# Patient Record
Sex: MALE | Race: WHITE | HISPANIC OR LATINO | Employment: STUDENT | URBAN - METROPOLITAN AREA
[De-identification: names, ages, dates, MRNs, and addresses within clinical notes are randomized per-mention and may not be internally consistent; named-entity substitution may affect disease eponyms.]

---
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
## 2025-01-28 DIAGNOSIS — M54.32 SCIATICA OF LEFT SIDE: Primary | ICD-10-CM

## 2025-02-23 ENCOUNTER — OFFICE VISIT (OUTPATIENT)
Dept: URGENT CARE | Age: 20
End: 2025-02-23
Payer: MEDICAID

## 2025-02-23 ENCOUNTER — APPOINTMENT (OUTPATIENT)
Dept: URGENT CARE | Age: 20
End: 2025-02-23
Payer: MEDICAID

## 2025-02-23 DIAGNOSIS — B34.9 VIRAL ILLNESS: Primary | ICD-10-CM

## 2025-02-23 DIAGNOSIS — J06.9 UPPER RESPIRATORY TRACT INFECTION, UNSPECIFIED TYPE: ICD-10-CM

## 2025-02-23 PROCEDURE — 99213 OFFICE O/P EST LOW 20 MIN: CPT | Performed by: PHYSICIAN ASSISTANT

## 2025-02-23 PROCEDURE — 1036F TOBACCO NON-USER: CPT | Performed by: PHYSICIAN ASSISTANT

## 2025-02-23 NOTE — PATIENT INSTRUCTIONS
You were seen for cold like symptoms.  You most likely have a viral upper respiratory tract infection.  Antibiotics are not needed at this time.    I recommend supportive therapy with the following medications below.    URI  1.  Please take Tylenol every 6 hours as needed for fever.  Alternate with ibuprofen every 6 hours.  2.  Use Tea and honey for cough. This is known to work better than most OTC medications.  3.  You can use Mucinex to break up congestion.  If prescribed use Pseudoephedrine-bromphen for cough. -This is an antihistamine, cough suppressant, and decongestant. It can help relieve cough, runny nose, stuffy nose, sneezing, and itchy or watery eyes.  4.  Stay well-hydrated and drink lots of fluids.  5.  Follow up with your primary care physician.  6.  Return to ED if symptoms worsen or new symptoms develop.    Based on clinical exam findings and history you most likely have a upper respiratory tract infection.  You are contagious as soon as the symptoms start.  Your symptoms may persist up to 2-3 weeks.  Symptoms usually peak by days 3 or 5.  The virus can be shed by hand-to-hand contact, nose and eye contact. Antibiotics are not necessary and do not help treat viral respiratory tract infections. Common respiratory tract infections include the common cold. Typical symptoms include nasal congestion, a runny nose, scratchy throat, cough, and irritability. The diagnosis is based on symptoms. Good hand hygiene is the best way to prevent these infections, and routine vaccination can help prevent influenza. Treatment aims to relieve symptoms. Rest and fluids. Tylenol and/or ibuprofen for fever and pain. Over the counter decongestants or mucolytics. Antibiotics are not necessary and do not help treat viral respiratory tract infections.

## 2025-02-23 NOTE — PROGRESS NOTES
Urgent Care Virtual Video Visit    Patient Location:  [place]  Provider Location: [facility]    Video visit completed with realtime synchronous video and or audio connection. Informed consent was obtained from the patient. Patient was made aware that my evaluation and diagnosis are limited due to the fact that we are not in the same room during the interview and that this is a virtual encounter that took place via audio and/or videoconferencing. Patient verbalized understanding.  I have communicated my name and active licensure.  The patient's identity and physical location were verified at the time of this visit.  Either the patient or their legal representative has been informed of the risks and benefits of and alternatives to treatment through a remote evaluation and consents to proceed with the evaluation remotely.      Subjective   Patient ID: Marcus Duran is a 19 y.o. male. They present today with a chief complaint of No chief complaint on file..    History of Present Illness:    Patient presenting for fever, ST, sinus congestion, and a semi-productive cough.  Requesting a work note.  No chest pain, shortness of breath, abdominal pain, nausea, vomiting, diarrhea, rash.    Past Medical History  Allergies as of 02/23/2025    (No Known Allergies)       (Not in a hospital admission)    History reviewed. No pertinent past medical history.    History reviewed. No pertinent surgical history.     reports that he has never smoked. He has never been exposed to tobacco smoke. He has never used smokeless tobacco. He reports current alcohol use. He reports that he does not use drugs.    Review of Systems  Review of Systems    After reviewing all body systems I have documented pertinent findings above in the history.  All other Systems reviewed and are negative for complaint.  Pertinent positive and negatives are listed in the above HPI.        Objective    There were no vitals filed for this visit.  No LMP for male  patient.    Physical Exam    Constitutional: Well-developed, well-nourished.  Alert.  Overall well-appearing.  Head and face: Normal  Mouth: No lip or tongue swelling.  No trismus.  Neck: Neck is supple.  Pulmonary: No respiratory distress.     MDM:  An interactive audio and / or visual telecommunication system which permits real-time communication between the patient and provider was utilized to provide this telehealth service.    Patient presenting for URI type symptoms.     Patient does not appear toxic. No acute distress or respiratory compromise.  No tripoding. No nasal flaring.  Speaks in complete sentences.  Given symptom onset/length of illness, a viral etiology is considered the most likely cause. Through shared decision making antibiotics are not warranted, and were not prescribed. Advised over the counter medication with good follow up.  Pt/family instructed to return if symptoms worsen or if new symptoms develop. Patient/family expressed understanding and consented to the above plan. No barriers of communication were apparent.        Orders and Diagnoses  Diagnoses and all orders for this visit:  Viral illness  Upper respiratory tract infection, unspecified type      Medical Admin Record      Patient disposition: Home    Electronically signed by [facility]  3:43 PM

## 2025-05-02 ENCOUNTER — APPOINTMENT (OUTPATIENT)
Dept: PRIMARY CARE | Facility: CLINIC | Age: 20
End: 2025-05-02
Payer: MEDICAID

## 2025-05-02 VITALS
SYSTOLIC BLOOD PRESSURE: 110 MMHG | HEIGHT: 66 IN | DIASTOLIC BLOOD PRESSURE: 62 MMHG | BODY MASS INDEX: 22.02 KG/M2 | RESPIRATION RATE: 16 BRPM | WEIGHT: 137 LBS

## 2025-05-02 DIAGNOSIS — J30.2 SEASONAL ALLERGIES: Primary | ICD-10-CM

## 2025-05-02 DIAGNOSIS — Z72.0 CURRENT EVERY DAY NICOTINE VAPING: ICD-10-CM

## 2025-05-02 PROCEDURE — 3008F BODY MASS INDEX DOCD: CPT | Performed by: NURSE PRACTITIONER

## 2025-05-02 PROCEDURE — 99213 OFFICE O/P EST LOW 20 MIN: CPT | Performed by: NURSE PRACTITIONER

## 2025-05-02 PROCEDURE — 1036F TOBACCO NON-USER: CPT | Performed by: NURSE PRACTITIONER

## 2025-05-02 RX ORDER — OLOPATADINE HYDROCHLORIDE 1 MG/ML
1 SOLUTION OPHTHALMIC 2 TIMES DAILY PRN
Qty: 5 ML | Refills: 0 | Status: SHIPPED | OUTPATIENT
Start: 2025-05-02 | End: 2025-08-30

## 2025-05-02 RX ORDER — CETIRIZINE HYDROCHLORIDE 10 MG/1
10 TABLET ORAL DAILY
Qty: 30 TABLET | Refills: 2 | Status: SHIPPED | OUTPATIENT
Start: 2025-05-02 | End: 2025-07-31

## 2025-05-02 ASSESSMENT — PATIENT HEALTH QUESTIONNAIRE - PHQ9
SUM OF ALL RESPONSES TO PHQ9 QUESTIONS 1 AND 2: 0
1. LITTLE INTEREST OR PLEASURE IN DOING THINGS: NOT AT ALL
2. FEELING DOWN, DEPRESSED OR HOPELESS: NOT AT ALL

## 2025-05-02 ASSESSMENT — ENCOUNTER SYMPTOMS
EYE REDNESS: 1
ALLERGIC REACTION: 1
PSYCHIATRIC NEGATIVE: 1
COUGH: 1
EYE WATERING: 1
CONSTITUTIONAL NEGATIVE: 1
CARDIOVASCULAR NEGATIVE: 1
GASTROINTESTINAL NEGATIVE: 1
EYE ITCHING: 1
ENDOCRINE NEGATIVE: 1
MUSCULOSKELETAL NEGATIVE: 1
HEMATOLOGIC/LYMPHATIC NEGATIVE: 1

## 2025-05-02 ASSESSMENT — PAIN SCALES - GENERAL: PAINLEVEL_OUTOF10: 0-NO PAIN

## 2025-05-02 NOTE — PATIENT INSTRUCTIONS
Here's a more detailed look at some effective stretches:    Knee-to-Chest Stretch:  .  Opens in new tab  Lie on your back with your knees bent and feet flat on the floor. Grasp the back of one thigh and bring your knee towards your chest, says [facility]. Hold for 30 seconds, then repeat with the opposite leg.     Lumbar Rotation Stretch:  .  Opens in new tab  Lie on your back with knees bent and feet flat. Keep your shoulders on the floor and gently roll your bent knees to one side, says the [facility]. Hold for 5-10 seconds, then return to the starting position and repeat on the other side.     Cat-Cow Stretch:  .  Opens in new tab  Start on your hands and knees, then arch your back like a cat, says [email or web address]. Next, dip your back low like a cow, says [employer]. Repeat several times to stretch and loosen your lower back muscles.     Double Knee Torso Rotation:  .  Opens in new tab  Lie on your back with knees bent and feet together. Place your arms out to the sides. Lift both knees towards your chest, then lower them together to one side, says [facility]. Keep your shoulders relaxed and pressed into the floor. Hold for 10-30 seconds, then repeat on the other side.

## 2025-05-02 NOTE — PROGRESS NOTES
"Subjective   Patient ID: Marcus Duran is a 19 y.o. male who presents for allergies     Patient reports that his eyes have been really itchy with some sneezing   Not using his Flonase or Claritin- reports that he is not taking any medications       Allergic Reaction  This is a chronic problem. The current episode started more than 1 week ago. The problem occurs constantly. The problem is unchanged. The problem is moderate. Time of exposure: pollen onset. Associated symptoms include coughing, eye itching, eye redness and eye watering. There is no swelling present. Past treatments include nothing. The treatment provided no relief. There is no history of asthma, atopic dermatitis, food allergies, medication allergies or seasonal allergies.        Review of Systems   Constitutional: Negative.    HENT: Negative.     Eyes:  Positive for redness and itching.   Respiratory:  Positive for cough.    Cardiovascular: Negative.    Gastrointestinal: Negative.    Endocrine: Negative.    Genitourinary: Negative.    Musculoskeletal: Negative.    Skin: Negative.    Allergic/Immunologic: Negative for food allergies.   Hematological: Negative.    Psychiatric/Behavioral: Negative.     All other systems reviewed and are negative.      Objective   /62   Resp 16   Ht 1.676 m (5' 6\")   Wt 62.1 kg (137 lb)   BMI 22.11 kg/m²     Physical Exam  Vitals reviewed.   HENT:      Head: Normocephalic.   Cardiovascular:      Rate and Rhythm: Normal rate and regular rhythm.      Pulses: Normal pulses.      Heart sounds: Normal heart sounds. No murmur heard.     No friction rub. No gallop.   Pulmonary:      Effort: Pulmonary effort is normal. No respiratory distress.      Breath sounds: Normal breath sounds. No stridor. No wheezing, rhonchi or rales.   Chest:      Chest wall: No tenderness.   Neurological:      General: No focal deficit present.      Mental Status: He is alert and oriented to person, place, and time. Mental status is at " baseline.   Psychiatric:         Mood and Affect: Mood normal.         Behavior: Behavior normal.         Thought Content: Thought content normal.         Judgment: Judgment normal.         Assessment/Plan   Problem List Items Addressed This Visit           ICD-10-CM    Seasonal allergies - Primary J30.2    Relevant Medications    olopatadine (Patanol) 0.1 % ophthalmic solution    cetirizine (ZyrTEC) 10 mg tablet    Current every day nicotine vaping Z72.0    Discussed Wellbutrin  trial and getting down off nicotine.

## 2025-05-02 NOTE — PROGRESS NOTES
"Subjective   History was provided by the {relatives:49755}.  Marcus Duran is a 19 y.o. male who is here for this well-child visit.  History of previous adverse reactions to immunizations? {yes***/no:30777::\"no\"}    Current Issues:  Current concerns include ***.  Currently menstruating? {yes/no/not applicable:19512}  Sexually active? {yes***/no:45792::\"no\"}   Does patient snore? {yes***/no:42394::\"no\"}     Review of Nutrition:  Current diet: ***  Balanced diet? {yes/no***:64}    Social Screening:   Parental relations: ***  Sibling relations: {siblings:55616}  Discipline concerns? {yes***/no:93854::\"no\"}  Concerns regarding behavior with peers? {yes***/no:11629::\"no\"}  School performance: {performance:02312::\"doing well; no concerns\"}  Secondhand smoke exposure? {yes***/no:45829::\"no\"}    Screening Questions:  Risk factors for anemia: {yes***/no:01453::\"no\"}  Risk factors for vision problems: {yes***/no:53488::\"no\"}  Risk factors for hearing problems: {yes***/no:52434::\"no\"}  Risk factors for tuberculosis: {yes***/no:73080::\"no\"}  Risk factors for dyslipidemia: {yes***/no:40188::\"no\"}  Risk factors for sexually-transmitted infections: {yes***/no:52798::\"no\"}  Risk factors for alcohol/drug use:  {yes***/no:98162::\"no\"}    Objective   There were no vitals taken for this visit.  Growth parameters are noted and {are:32819::\"are\"} appropriate for age.  General:   {general exam:45837::\"alert and oriented, in no acute distress\"}   Gait:   {normal/abnormal***:54635::\"normal\"}   Skin:   {skin brief exam:104::\"normal\"}   Oral cavity:   {oropharynx exam:03023::\"lips, mucosa, and tongue normal; teeth and gums normal\"}   Eyes:   {eye peds:46027::\"sclerae white\",\"pupils equal and reactive\",\"red reflex normal bilaterally\"}   Ears:   {ear tm:58171::\"normal bilaterally\"}   Neck:   {neck exam:17202::\"no adenopathy\",\"no carotid bruit\",\"no JVD\",\"supple, symmetrical, trachea midline\",\"thyroid not enlarged, symmetric, no " "tenderness/mass/nodules\"}   Lungs:  {lung exam:88249::\"clear to auscultation bilaterally\"}   Heart:   {heart exam:5510::\"regular rate and rhythm, S1, S2 normal, no murmur, click, rub or gallop\"}   Abdomen:  {abdomen exam:73460::\"soft, non-tender; bowel sounds normal; no masses, no organomegaly\"}   :  {genital exam:37651::\"exam deferred\"}   Christopher Stage:   ***   Extremities:  {extremity exam:5109::\"extremities normal, warm and well-perfused; no cyanosis, clubbing, or edema\"}   Neuro:  {neuro exam:5902::\"normal without focal findings\",\"mental status, speech normal, alert and oriented x3\",\"KIZZY\",\"reflexes normal and symmetric\"}     Assessment/Plan   Well adolescent.  1. Anticipatory guidance discussed.  {guidance:46022}  2.  Weight management:  The patient was counseled regarding {PED MU OBESITY COUNSELIN}.  3. Development: {desc; development appropriate/delayed:::\"appropriate for age\"}  4. No orders of the defined types were placed in this encounter.    "